# Patient Record
Sex: FEMALE | Race: WHITE | HISPANIC OR LATINO | Employment: FULL TIME | URBAN - METROPOLITAN AREA
[De-identification: names, ages, dates, MRNs, and addresses within clinical notes are randomized per-mention and may not be internally consistent; named-entity substitution may affect disease eponyms.]

---

## 2017-01-17 ENCOUNTER — OFFICE VISIT (OUTPATIENT)
Dept: OBSTETRICS AND GYNECOLOGY | Facility: CLINIC | Age: 27
End: 2017-01-17

## 2017-01-17 VITALS
WEIGHT: 150.8 LBS | SYSTOLIC BLOOD PRESSURE: 100 MMHG | BODY MASS INDEX: 25.74 KG/M2 | HEIGHT: 64 IN | DIASTOLIC BLOOD PRESSURE: 62 MMHG

## 2017-01-17 DIAGNOSIS — Z01.419 ENCOUNTER FOR GYNECOLOGICAL EXAMINATION WITHOUT ABNORMAL FINDING: Primary | ICD-10-CM

## 2017-01-17 DIAGNOSIS — Z13.9 SCREENING: ICD-10-CM

## 2017-01-17 DIAGNOSIS — Z12.4 SCREENING FOR MALIGNANT NEOPLASM OF CERVIX: ICD-10-CM

## 2017-01-17 LAB
BILIRUB BLD-MCNC: NEGATIVE MG/DL
CLARITY, POC: CLEAR
COLOR UR: YELLOW
GLUCOSE UR STRIP-MCNC: NEGATIVE MG/DL
KETONES UR QL: NEGATIVE
LEUKOCYTE EST, POC: NEGATIVE
NITRITE UR-MCNC: NEGATIVE MG/ML
PH UR: 6.5 [PH] (ref 5–8)
PROT UR STRIP-MCNC: NEGATIVE MG/DL
RBC # UR STRIP: NEGATIVE /UL
SP GR UR: 1.03 (ref 1–1.03)
UROBILINOGEN UR QL: NORMAL

## 2017-01-17 PROCEDURE — 81002 URINALYSIS NONAUTO W/O SCOPE: CPT | Performed by: OBSTETRICS & GYNECOLOGY

## 2017-01-17 PROCEDURE — 99395 PREV VISIT EST AGE 18-39: CPT | Performed by: OBSTETRICS & GYNECOLOGY

## 2017-01-17 RX ORDER — SODIUM FLUORIDE 6 MG/ML
PASTE, DENTIFRICE DENTAL
COMMUNITY
Start: 2017-01-12

## 2017-01-17 NOTE — PROGRESS NOTES
"[facility]    GYN ANNUAL EXAM:  Chief Complaint   Patient presents with   • Gynecologic Exam     LAST PAP 2015 NEGATIVE        Subjective   History of Present Illness    Ana Samaniego is a 26 y.o. female who presents for annual exam.  She is doing well.  She is breast-feeding.  So he is now 8 months old.  Her  Melquiades will be deployed in November.  They're planning another pregnancy.  She is hoping to conceive before he deploys.  Prenatal vitamin daily.  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  She has been seeing Carola Maria pelvic floor physical therapist and has had 4 visits there.  She has a home exercise regimen and is feeling much better.  She has stopped playing ice hockey which severely aggravated the problem.  She has learned to be very careful about her movements.    Obstetric History:  OB History      Para Term  AB TAB SAB Ectopic Multiple Living    1 1 1      0 1         Menstrual History:     No LMP recorded.       Sexual History: active          Family history of breast cancer: no  Family history of ovarian cancer: no  Family history of uterine cancer: no  Family History of cervical cancer: no  Family History of colon cancer/colon polyps: no  Regular self breast exam: yes  Current contraception:CONDOMS  History of abnormal Pap smear: no  Received Gardasil immunization: YES  KALYN exposure in utero: no  History of abnormal mammogram: no    The following portions of the patient's history were reviewed and updated as appropriate: allergies, current medications, past family history, past medical history, past social history, past surgical history and problem list.    Review of Systems    Pertinent items are noted in HPI.       Objective   Physical Exam    Visit Vitals   • /62   • Ht 64\" (162.6 cm)   • Wt 150 lb 12.8 oz (68.4 kg)   • Breastfeeding Yes   • BMI 25.88 kg/m2       General:   alert, appears stated age and " cooperative   Heart: regular rate and rhythm, S1, S2 normal, no murmur, click, rub or gallop   Lungs: clear to auscultation bilaterally   Abdomen: soft, non-tender, without masses or organomegaly   Breast: inspection negative, no nipple discharge or bleeding, no masses or nodularity palpable   Vulva: normal, Bartholin's, Urethra, [place]'s normal   Vagina: normal mucosa, normal discharge   Cervix: no bleeding following Pap, no cervical motion tenderness and no lesions   Uterus: normal size, midline, anteverted, non-tender, mobile   Adnexa: normal adnexa and no mass, fullness, tenderness     Assessment/Plan   Ana was seen today for gynecologic exam.    Diagnoses and all orders for this visit:    Encounter for gynecological examination without abnormal finding    Screening  -     POC Urinalysis Dipstick    Screening for malignant neoplasm of cervix  -     IGP, Rfx Aptima HPV ASCU        Thin prep Pap smear.  Contraception: NONE.  All questions answered.  Await pap smear results.  Discussed healthy lifestyle modifications.  Follow up in 1 year.  Continue pelvic floor physical therapy and exercises per therapist Carola Maria for postpartum symphysis pubis dysfunction   PNV daily.             Zuleyka Moraes MD,  FACOG

## 2017-01-17 NOTE — MR AVS SNAPSHOT
Ana Samaniego   1/17/2017 9:15 AM   Office Visit    Dept Phone:  [phone]   Encounter #:  [id number]    Provider:  Zuleyka Moraes MD   Department:  [facility]                Your Full Care Plan              Today's Medication Changes          These changes are accurate as of: 1/17/17 10:11 AM.  If you have any questions, ask your nurse or doctor.               Stop taking medication(s)listed here:     ibuprofen 800 MG tablet   Commonly known as:  ADVIL,MOTRIN   Stopped by:  Zuleyka Moraes MD           PRENATAL VITAMINS PO   Stopped by:  Zuleyka Moraes MD           VENTOLIN  (90 BASE) MCG/ACT inhaler   Generic drug:  albuterol   Stopped by:  Zuleyka Moraes MD                      Your Updated Medication List          This list is accurate as of: 1/17/17 10:11 AM.  Always use your most recent med list.                ALBUTEROL IN       FLUoxetine 20 MG capsule   Commonly known as:  PROZAC   Take 1 capsule by mouth daily.       montelukast 10 MG tablet   Commonly known as:  SINGULAIR       PREVIDENT 5000 BOOSTER PLUS 1.1 % paste   Generic drug:  Sodium Fluoride               We Performed the Following     IGP, Rfx Aptima HPV ASCU     POC Urinalysis Dipstick       You Were Diagnosed With        Codes Comments    Encounter for gynecological examination without abnormal finding    -  Primary ICD-10-CM: Z01.419  ICD-9-CM: V72.31     Screening     ICD-10-CM: Z13.9  ICD-9-CM: V82.9     Screening for malignant neoplasm of cervix     ICD-10-CM: Z12.4  ICD-9-CM: V76.2       Instructions     None    Patient Instructions History      Upcoming Appointments     Visit Type Date Time Department    OFFICE VISIT 1/17/2017  9:15 AM FAREED KIMBLE      [employer] Signup     Our records indicate that you have an active [facility] [employer] account.    You can view your After Visit Summary by going to  [email or web address] and logging in with your [employer] username and password.  If you don't have a [employer] username and password but a parent or guardian has access to your record, the parent or guardian should login with their own [employer] username and password and access your record to view the After Visit Summary.    If you have questions, you can email [email or web address] or call [phone] to talk to our [employer] staff.  Remember, [employer] is NOT to be used for urgent needs.  For medical emergencies, dial 911.               Other Info from Your Visit           Other Notes About Your Plan     Blood transfusion acceptable    Latex allergy no    Chart Paper    Flu vaccine    Genetic screening declined    Tdap 3/1/16    Gender female        Allergies     Cephalexin  Hives    Pt not opposed to taking this med again      Reason for Visit     Gynecologic Exam LAST PAP 2/11/2015 NEGATIVE       Vital Signs     Blood Pressure Height Weight Breastfeeding? Body Mass Index Smoking Status    100/62 64\" (162.6 cm) 150 lb 12.8 oz (68.4 kg) Yes 25.88 kg/m2 Never Smoker      Problems and Diagnoses Noted     Encounter for routine gynecological examination    -  Primary    Screening        Screening for cervical cancer          Results     POC Urinalysis Dipstick      Component Value Standard Range & Units    Color Yellow Yellow, Straw, Dark Yellow, Mary    Clarity, UA Clear Clear    Glucose, UA Negative Negative, 1000 mg/dL (3+) mg/dL    Bilirubin Negative Negative    Ketones, UA Negative Negative    Specific Gravity  1.030 1.005 - 1.030    Blood, UA Negative Negative    pH, Urine 6.5 5.0 - 8.0    Protein, POC Negative Negative mg/dL    Urobilinogen, UA Normal Normal    Leukocytes Negative Negative    Nitrite, UA Negative Negative                    "

## 2017-01-19 LAB
CONV .: NORMAL
CYTOLOGIST CVX/VAG CYTO: NORMAL
CYTOLOGY CVX/VAG DOC THIN PREP: NORMAL
DX ICD CODE: NORMAL
HIV 1 & 2 AB SER-IMP: NORMAL
OTHER STN SPEC: NORMAL
PATH REPORT.FINAL DX SPEC: NORMAL
STAT OF ADQ CVX/VAG CYTO-IMP: NORMAL

## 2017-01-24 ENCOUNTER — TELEPHONE (OUTPATIENT)
Dept: OBSTETRICS AND GYNECOLOGY | Facility: CLINIC | Age: 27
End: 2017-01-24

## 2017-01-24 NOTE — TELEPHONE ENCOUNTER
LM WITH NORMAL PAP SMEAR RESULTS PER DR FALK, ADVISED IF SHE HAS ANY QUESTIONS TO CALL THE OFFICE AND ASK FOR ME

## 2017-01-24 NOTE — TELEPHONE ENCOUNTER
----- Message from Zuleyka Moraes MD sent at 1/23/2017  1:12 PM EST -----  Congratulations your pap testing is normal      Thank you  Zuleyka Moraes MD

## 2017-06-22 ENCOUNTER — TELEPHONE (OUTPATIENT)
Dept: OBSTETRICS AND GYNECOLOGY | Facility: CLINIC | Age: 27
End: 2017-06-22

## 2017-07-05 NOTE — TELEPHONE ENCOUNTER
I am unable to see her if she is not willing to see a male provider, especially if I am on vacation.

## 2018-01-21 ENCOUNTER — HOSPITAL ENCOUNTER (EMERGENCY)
Facility: HOSPITAL | Age: 28
Discharge: HOME | End: 2018-01-21
Attending: EMERGENCY MEDICINE | Admitting: EMERGENCY MEDICINE

## 2018-01-21 VITALS
SYSTOLIC BLOOD PRESSURE: 105 MMHG | TEMPERATURE: 99.2 F | WEIGHT: 157 LBS | BODY MASS INDEX: 26.16 KG/M2 | RESPIRATION RATE: 18 BRPM | OXYGEN SATURATION: 99 % | HEIGHT: 65 IN | DIASTOLIC BLOOD PRESSURE: 62 MMHG | HEART RATE: 82 BPM

## 2018-01-21 DIAGNOSIS — E86.0 DEHYDRATION: ICD-10-CM

## 2018-01-21 DIAGNOSIS — Z3A.13 13 WEEKS GESTATION OF PREGNANCY: ICD-10-CM

## 2018-01-21 DIAGNOSIS — R11.2 NON-INTRACTABLE VOMITING WITH NAUSEA, UNSPECIFIED VOMITING TYPE: Primary | ICD-10-CM

## 2018-01-21 LAB
ANION GAP SERPL CALCULATED.3IONS-SCNC: 15.2 MMOL/L
BASOPHILS # BLD AUTO: 0.01 10*3/MM3 (ref 0–0.2)
BASOPHILS NFR BLD AUTO: 0.1 % (ref 0–1.5)
BILIRUB UR QL STRIP: NEGATIVE
BUN BLD-MCNC: 13 MG/DL (ref 6–20)
BUN/CREAT SERPL: 24.1 (ref 7–25)
CALCIUM SPEC-SCNC: 8.6 MG/DL (ref 8.6–10.5)
CHLORIDE SERPL-SCNC: 99 MMOL/L (ref 98–107)
CLARITY UR: CLEAR
CO2 SERPL-SCNC: 21.8 MMOL/L (ref 22–29)
COLOR UR: YELLOW
CREAT BLD-MCNC: 0.54 MG/DL (ref 0.57–1)
DEPRECATED RDW RBC AUTO: 43.9 FL (ref 37–54)
EOSINOPHIL # BLD AUTO: 0.02 10*3/MM3 (ref 0–0.7)
EOSINOPHIL NFR BLD AUTO: 0.2 % (ref 0.3–6.2)
ERYTHROCYTE [DISTWIDTH] IN BLOOD BY AUTOMATED COUNT: 13.4 % (ref 11.7–13)
FLUAV AG NPH QL: NEGATIVE
FLUBV AG NPH QL IA: NEGATIVE
GFR SERPL CREATININE-BSD FRML MDRD: 135 ML/MIN/1.73
GLUCOSE BLD-MCNC: 95 MG/DL (ref 65–99)
GLUCOSE UR STRIP-MCNC: ABNORMAL MG/DL
HCT VFR BLD AUTO: 37.9 % (ref 35.6–45.5)
HGB BLD-MCNC: 13.3 G/DL (ref 11.9–15.5)
HGB UR QL STRIP.AUTO: NEGATIVE
IMM GRANULOCYTES # BLD: 0.02 10*3/MM3 (ref 0–0.03)
IMM GRANULOCYTES NFR BLD: 0.2 % (ref 0–0.5)
KETONES UR QL STRIP: ABNORMAL
LEUKOCYTE ESTERASE UR QL STRIP.AUTO: NEGATIVE
LYMPHOCYTES # BLD AUTO: 0.55 10*3/MM3 (ref 0.9–4.8)
LYMPHOCYTES NFR BLD AUTO: 4.6 % (ref 19.6–45.3)
MCH RBC QN AUTO: 31.4 PG (ref 26.9–32)
MCHC RBC AUTO-ENTMCNC: 35.1 G/DL (ref 32.4–36.3)
MCV RBC AUTO: 89.6 FL (ref 80.5–98.2)
MONOCYTES # BLD AUTO: 0.31 10*3/MM3 (ref 0.2–1.2)
MONOCYTES NFR BLD AUTO: 2.6 % (ref 5–12)
NEUTROPHILS # BLD AUTO: 11.02 10*3/MM3 (ref 1.9–8.1)
NEUTROPHILS NFR BLD AUTO: 92.3 % (ref 42.7–76)
NITRITE UR QL STRIP: NEGATIVE
PH UR STRIP.AUTO: 6 [PH] (ref 5–8)
PLATELET # BLD AUTO: 172 10*3/MM3 (ref 140–500)
PMV BLD AUTO: 11.2 FL (ref 6–12)
POTASSIUM BLD-SCNC: 3.9 MMOL/L (ref 3.5–5.2)
PROT UR QL STRIP: NEGATIVE
RBC # BLD AUTO: 4.23 10*6/MM3 (ref 3.9–5.2)
SODIUM BLD-SCNC: 136 MMOL/L (ref 136–145)
SP GR UR STRIP: >=1.03 (ref 1–1.03)
UROBILINOGEN UR QL STRIP: ABNORMAL
WBC NRBC COR # BLD: 11.93 10*3/MM3 (ref 4.5–10.7)

## 2018-01-21 PROCEDURE — 99284 EMERGENCY DEPT VISIT MOD MDM: CPT

## 2018-01-21 PROCEDURE — 25810000003 DEXTROSE-NACL PER 500 ML: Performed by: NURSE PRACTITIONER

## 2018-01-21 PROCEDURE — 96365 THER/PROPH/DIAG IV INF INIT: CPT

## 2018-01-21 PROCEDURE — 96375 TX/PRO/DX INJ NEW DRUG ADDON: CPT

## 2018-01-21 PROCEDURE — 96366 THER/PROPH/DIAG IV INF ADDON: CPT

## 2018-01-21 PROCEDURE — 87804 INFLUENZA ASSAY W/OPTIC: CPT | Performed by: NURSE PRACTITIONER

## 2018-01-21 PROCEDURE — 25010000002 ONDANSETRON PER 1 MG: Performed by: NURSE PRACTITIONER

## 2018-01-21 PROCEDURE — 85025 COMPLETE CBC W/AUTO DIFF WBC: CPT | Performed by: NURSE PRACTITIONER

## 2018-01-21 PROCEDURE — 81003 URINALYSIS AUTO W/O SCOPE: CPT | Performed by: NURSE PRACTITIONER

## 2018-01-21 PROCEDURE — 80048 BASIC METABOLIC PNL TOTAL CA: CPT | Performed by: NURSE PRACTITIONER

## 2018-01-21 RX ORDER — DEXTROSE AND SODIUM CHLORIDE 5; .9 G/100ML; G/100ML
999 INJECTION, SOLUTION INTRAVENOUS CONTINUOUS
Status: DISCONTINUED | OUTPATIENT
Start: 2018-01-21 | End: 2018-01-21 | Stop reason: HOSPADM

## 2018-01-21 RX ORDER — SODIUM CHLORIDE 0.9 % (FLUSH) 0.9 %
10 SYRINGE (ML) INJECTION AS NEEDED
Status: DISCONTINUED | OUTPATIENT
Start: 2018-01-21 | End: 2018-01-21 | Stop reason: HOSPADM

## 2018-01-21 RX ORDER — ONDANSETRON 2 MG/ML
4 INJECTION INTRAMUSCULAR; INTRAVENOUS ONCE
Status: COMPLETED | OUTPATIENT
Start: 2018-01-21 | End: 2018-01-21

## 2018-01-21 RX ADMIN — ONDANSETRON 4 MG: 2 INJECTION INTRAMUSCULAR; INTRAVENOUS at 18:04

## 2018-01-21 RX ADMIN — DEXTROSE AND SODIUM CHLORIDE 999 ML/HR: 5; 900 INJECTION, SOLUTION INTRAVENOUS at 17:46

## 2018-01-21 NOTE — ED PROVIDER NOTES
"EMERGENCY DEPARTMENT ENCOUNTER    Room Number:  05/05  Date seen:  1/22/2018  Time seen: 5:36 PM  PCP: No Known Provider    HPI:  Chief complaint: Vomiting  Context:Ana Samaniego is a 27 y.o. female with a hx of UTI and anxiety who presents to the ED with c/o vomiting onset earlier today with her most recent episode being a couple hours ago. She also complains of nausea, low back pain, and generalized myalgias but denies abd pain, vaginal bleeding, urinary symptoms, or any other symptoms. She reports her daughter recently had similar symptoms which are now resolved. She is currently 15 weeks pregnant.    Timing: gradual  Duration: earlier today  Location: n/a  Radiation: none  Quality: \"vomiting\"  Intensity/Severity: moderate  Associated Symptoms: nausea, low back pain, and generalized myalgias   Aggravating Factors: none  Alleviating Factors: none  Previous Episodes: none  Treatment before arrival: Pt received no treatment PTA.    MEDICAL RECORD REVIEW    ALLERGIES  Cephalexin    PAST MEDICAL HISTORY  Active Ambulatory Problems     Diagnosis Date Noted   • Pregnancy 05/05/2016     Resolved Ambulatory Problems     Diagnosis Date Noted   • Asthma    • BV (bacterial vaginosis)    • Encounter for supervision of normal first pregnancy in third trimester 03/16/2016   • Cystitis of pregnancy in third trimester 03/30/2016     Past Medical History:   Diagnosis Date   • Anxiety    • Asthma    • BV (bacterial vaginosis) 11/25/2015   • Depression    • Need for HPV vaccine    • Urinary tract infection        PAST SURGICAL HISTORY  Past Surgical History:   Procedure Laterality Date   • EYE SURGERY Left 2003       FAMILY HISTORY  Family History   Problem Relation Age of Onset   • Depression Mother    • Bipolar disorder Mother    • Hypertension Father    • Heart disease Father    • Diabetes Father    • Leukemia Maternal Grandfather        SOCIAL HISTORY  Social History     Social History   • Marital status:      Spouse " name: Melquiades Garcia   • Number of children: 0   • Years of education: Bachelors Degree     Occupational History   • [employer]      Social History Main Topics   • Smoking status: Never Smoker   • Smokeless tobacco: Never Used   • Alcohol use Yes      Comment: occasionally   • Drug use: No   • Sexual activity: Yes     Partners: Male     Birth control/ protection: None     Other Topics Concern   • Not on file     Social History Narrative     TO MELQUIADES GARCIA IN 2015    MOM IS SHARI VITALE    Patient since 2/5/14.    Daughter is Meredith       REVIEW OF SYSTEMS  Review of Systems   Constitutional: Negative for activity change, appetite change, diaphoresis and fever.   HENT: Negative for trouble swallowing.    Eyes: Negative for visual disturbance.   Respiratory: Negative for cough, chest tightness, shortness of breath and wheezing.    Cardiovascular: Negative for chest pain, palpitations and leg swelling.   Gastrointestinal: Positive for nausea and vomiting. Negative for abdominal pain and diarrhea.   Genitourinary: Negative for dysuria.   Musculoskeletal: Positive for back pain (low) and myalgias (generalized).   Skin: Negative for rash.   Neurological: Negative for dizziness, speech difficulty and light-headedness.       PHYSICAL EXAM  ED Triage Vitals   Temp Heart Rate Resp BP SpO2   01/21/18 1658 01/21/18 1658 01/21/18 1658 01/21/18 1723 01/21/18 1658   98.3 °F (36.8 °C) 91 16 114/80 99 %      Temp src Heart Rate Source Patient Position BP Location FiO2 (%)   01/21/18 1658 01/21/18 1658 -- -- --   Tympanic Monitor        Physical Exam   Constitutional: She is oriented to person, place, and time and well-developed, well-nourished, and in no distress. No distress.   HENT:   Head: Normocephalic and atraumatic.   Mouth/Throat: Uvula is midline and mucous membranes are normal.   Neck: Normal range of motion. Neck supple.   Cardiovascular: Normal rate, regular rhythm, S1 normal, S2 normal and normal heart  sounds.  Exam reveals no gallop and no friction rub.    No murmur heard.  Pulmonary/Chest: Effort normal and breath sounds normal. No accessory muscle usage. No respiratory distress. She has no decreased breath sounds. She has no wheezes. She has no rhonchi. She has no rales.   Abdominal: Soft. Normal appearance and bowel sounds are normal. There is no tenderness. There is no rebound and no guarding.   Musculoskeletal: Normal range of motion.   Neurological: She is alert and oriented to person, place, and time.   Skin: Skin is warm, dry and intact.   Psychiatric: Affect and judgment normal.   Nursing note and vitals reviewed.      LAB RESULTS  Recent Results (from the past 24 hour(s))   Basic Metabolic Panel    Collection Time: 01/21/18  5:46 PM   Result Value Ref Range    Glucose 95 65 - 99 mg/dL    BUN 13 6 - 20 mg/dL    Creatinine 0.54 (L) 0.57 - 1.00 mg/dL    Sodium 136 136 - 145 mmol/L    Potassium 3.9 3.5 - 5.2 mmol/L    Chloride 99 98 - 107 mmol/L    CO2 21.8 (L) 22.0 - 29.0 mmol/L    Calcium 8.6 8.6 - 10.5 mg/dL    eGFR Non African Amer 135 >60 mL/min/1.73    BUN/Creatinine Ratio 24.1 7.0 - 25.0    Anion Gap 15.2 mmol/L   CBC Auto Differential    Collection Time: 01/21/18  5:46 PM   Result Value Ref Range    WBC 11.93 (H) 4.50 - 10.70 10*3/mm3    RBC 4.23 3.90 - 5.20 10*6/mm3    Hemoglobin 13.3 11.9 - 15.5 g/dL    Hematocrit 37.9 35.6 - 45.5 %    MCV 89.6 80.5 - 98.2 fL    MCH 31.4 26.9 - 32.0 pg    MCHC 35.1 32.4 - 36.3 g/dL    RDW 13.4 (H) 11.7 - 13.0 %    RDW-SD 43.9 37.0 - 54.0 fl    MPV 11.2 6.0 - 12.0 fL    Platelets 172 140 - 500 10*3/mm3    Neutrophil % 92.3 (H) 42.7 - 76.0 %    Lymphocyte % 4.6 (L) 19.6 - 45.3 %    Monocyte % 2.6 (L) 5.0 - 12.0 %    Eosinophil % 0.2 (L) 0.3 - 6.2 %    Basophil % 0.1 0.0 - 1.5 %    Immature Grans % 0.2 0.0 - 0.5 %    Neutrophils, Absolute 11.02 (H) 1.90 - 8.10 10*3/mm3    Lymphocytes, Absolute 0.55 (L) 0.90 - 4.80 10*3/mm3    Monocytes, Absolute 0.31 0.20 - 1.20  10*3/mm3    Eosinophils, Absolute 0.02 0.00 - 0.70 10*3/mm3    Basophils, Absolute 0.01 0.00 - 0.20 10*3/mm3    Immature Grans, Absolute 0.02 0.00 - 0.03 10*3/mm3   Influenza Antigen, Rapid - Swab, Nasopharynx    Collection Time: 01/21/18  7:03 PM   Result Value Ref Range    Influenza A Ag, EIA Negative Negative    Influenza B Ag, EIA Negative Negative   Urinalysis With / Culture If Indicated - Urine, Clean Catch    Collection Time: 01/21/18  8:00 PM   Result Value Ref Range    Color, UA Yellow Yellow, Straw    Appearance, UA Clear Clear    pH, UA 6.0 5.0 - 8.0    Specific Gravity, UA >=1.030 1.005 - 1.030    Glucose, UA >=1000 mg/dL (3+) (A) Negative    Ketones, UA 80 mg/dL (3+) (A) Negative    Bilirubin, UA Negative Negative    Blood, UA Negative Negative    Protein, UA Negative Negative    Leuk Esterase, UA Negative Negative    Nitrite, UA Negative Negative    Urobilinogen, UA 0.2 E.U./dL 0.2 - 1.0 E.U./dL       I ordered the above labs and reviewed the results    MEDICATIONS GIVEN IN ER  Medications   ondansetron (ZOFRAN) injection 4 mg (4 mg Intravenous Given 1/21/18 1804)     Procedures    COURSE & MEDICAL DECISION MAKING  Pertinent Labs and Imaging studies that were ordered and reviewed are noted above.  Results were reviewed/discussed with the patient and they were also made aware of online assess.  Pt also made aware that some labs, such as cultures, will not be resulted during ER visit and follow up with PMD is necessary.     PROGRESS AND CONSULTS    Progress Notes:    ED Course     1847  Pt rechecked and nausea improved. Bedside US performed by me on pt. Fetus is very active, well appearing, heartbeat identified. Discussed plan to discharge pt with nausea medication due to possibility of stomach bug. Pt understands and agrees with plan for discharge, all questions addressed .    Reviewed pt's history and workup with Dr. Prasad.  After a bedside evaluation; Dr. Prasad agrees with the plan of  "care    The patient's history, physical exam, and lab findings were discussed with the physician, who also performed a face to face history and physical exam.  I discussed all results and noted any abnormalities with patient.  Discussed absoute need to recheck abnormalities with their family physician.  I answered any of the patient's questions.  Discussed plan for discharge, as there is no emergent indication for admission.  Pt is agreeable and understands need for follow up and repeat testing.  Pt is aware that discharge does not mean that nothing is wrong but it indicates no emergency is present and they must continue care with their family physician.  Pt is discharged with instructions to follow up with primary care doctor to have their blood pressure rechecked.     Disposition vitals:  /62  Pulse 82  Temp 99.2 °F (37.3 °C) (Oral)   Resp 18  Ht 165.1 cm (65\")  Wt 71.2 kg (157 lb)  LMP  (Exact Date)  SpO2 99%  Breastfeeding? No  BMI 26.13 kg/m2      DIAGNOSIS  Final diagnoses:   Non-intractable vomiting with nausea, unspecified vomiting type   Dehydration   13 weeks gestation of pregnancy       FOLLOW UP   Your OB/GYN provider as scheduled            RX     Medication List      Notice     No changes were made to your prescriptions during this visit.        Documentation assistance provided by zane Howard and Daiana Ron for MILENA Guerra.  Information recorded by the zane was done at my direction and has been verified and validated by me.  Electronically signed by Alfred Howard and Daiana Ron on 1/21/2018 at time 1:14 PM                Daiana Ron  01/21/18 3638       MILENA Balbuena  01/22/18 1314    "

## 2018-01-22 NOTE — DISCHARGE INSTRUCTIONS
Drink PLENTY of fluids, tank up on lots and lots of fluids    Return Precautions    Although you are being discharged from the ED today, I encourage you to return for worsening symptoms.  Things can, and do, change such that treatment at home with medication may not be adequate.      Specifically, return for any of the following:    Chest pain, shortness of breath, pain or nausea and vomiting not controlled by medications provided.    Please make a follow up with your Primary Care Provider for a blood pressure recheck.

## 2018-01-22 NOTE — ED PROVIDER NOTES
Discussed pt's case with MARTELL Delgado.  Pt presents to the ER 15 weeks pregnant and c/o N/V. PT denies any blood in the emesis. Pt states her family has been recently sick. Pt also c/o chills, but denies fever or vaginal discharge/ bleeding.   Exam: Pt is awake, alert, and in no acute distress. Pt is tolerating ice chips in the ER. Exam was unremarkable.     I supervised care provided by the midlevel provider.    We have discussed this patient's history, physical exam, and treatment plan.   I have reviewed the note and personally saw and examined the patient and agree with the plan of care.    Documentation assistance provided by zane Correa for Dr. Prasad.  Information recorded by the scribe was done at my direction and has been verified and validated by me.       Viet Correa  01/21/18 [id number]       Blanco Prasad MD  01/21/18 7547